# Patient Record
Sex: FEMALE | Race: WHITE | NOT HISPANIC OR LATINO | URBAN - METROPOLITAN AREA
[De-identification: names, ages, dates, MRNs, and addresses within clinical notes are randomized per-mention and may not be internally consistent; named-entity substitution may affect disease eponyms.]

---

## 2019-08-15 ENCOUNTER — OFFICE VISIT (OUTPATIENT)
Dept: FAMILY MEDICINE | Facility: CLINIC | Age: 64
End: 2019-08-15
Payer: COMMERCIAL

## 2019-08-15 ENCOUNTER — HOSPITAL ENCOUNTER (OUTPATIENT)
Dept: RADIOLOGY | Age: 64
Discharge: HOME | End: 2019-08-15
Attending: INTERNAL MEDICINE
Payer: COMMERCIAL

## 2019-08-15 VITALS
OXYGEN SATURATION: 98 % | HEIGHT: 64 IN | BODY MASS INDEX: 21.24 KG/M2 | DIASTOLIC BLOOD PRESSURE: 70 MMHG | RESPIRATION RATE: 16 BRPM | WEIGHT: 124.4 LBS | TEMPERATURE: 98.3 F | SYSTOLIC BLOOD PRESSURE: 110 MMHG | HEART RATE: 92 BPM

## 2019-08-15 DIAGNOSIS — R10.84 GENERALIZED ABDOMINAL PAIN: ICD-10-CM

## 2019-08-15 DIAGNOSIS — R11.0 NAUSEA: ICD-10-CM

## 2019-08-15 DIAGNOSIS — K59.00 CONSTIPATION, UNSPECIFIED CONSTIPATION TYPE: ICD-10-CM

## 2019-08-15 DIAGNOSIS — K59.00 CONSTIPATION, UNSPECIFIED CONSTIPATION TYPE: Primary | ICD-10-CM

## 2019-08-15 PROCEDURE — 36415 COLL VENOUS BLD VENIPUNCTURE: CPT | Performed by: INTERNAL MEDICINE

## 2019-08-15 PROCEDURE — 99203 OFFICE O/P NEW LOW 30 MIN: CPT | Mod: 25 | Performed by: INTERNAL MEDICINE

## 2019-08-15 PROCEDURE — 74022 RADEX COMPL AQT ABD SERIES: CPT

## 2019-08-15 RX ORDER — ACETAMINOPHEN 500 MG
2000 TABLET ORAL DAILY
COMMUNITY

## 2019-08-15 RX ORDER — MULTIVITAMIN WITH IRON
2 TABLET ORAL DAILY
COMMUNITY

## 2019-08-15 RX ORDER — FLUOXETINE HYDROCHLORIDE 20 MG/1
20 CAPSULE ORAL DAILY
COMMUNITY

## 2019-08-15 RX ORDER — BUPROPION HYDROCHLORIDE 150 MG/1
150 TABLET ORAL DAILY
COMMUNITY

## 2019-08-15 RX ORDER — VALACYCLOVIR HYDROCHLORIDE 500 MG/1
500 TABLET, FILM COATED ORAL DAILY
COMMUNITY

## 2019-08-15 RX ORDER — FLAXSEED OIL 1000 MG
CAPSULE ORAL
COMMUNITY

## 2019-08-15 ASSESSMENT — ENCOUNTER SYMPTOMS
PALPITATIONS: 0
UNEXPECTED WEIGHT CHANGE: 0
WHEEZING: 0
APPETITE CHANGE: 0
HEMATURIA: 0
SHORTNESS OF BREATH: 0
STRIDOR: 0
APNEA: 0
CHILLS: 0
CONSTIPATION: 1
POLYPHAGIA: 0
DIFFICULTY URINATING: 0
ANAL BLEEDING: 0
SINUS PRESSURE: 0
CHOKING: 0
VOMITING: 0
CHEST TIGHTNESS: 0
COUGH: 0
FLANK PAIN: 0
FATIGUE: 0
PHOTOPHOBIA: 0
BLOOD IN STOOL: 0
FREQUENCY: 0
EYE PAIN: 0
VOICE CHANGE: 0
ACTIVITY CHANGE: 0
EYE DISCHARGE: 0
FEVER: 0
SINUS PAIN: 0
DIARRHEA: 0
EYE REDNESS: 0
NAUSEA: 1
POLYDIPSIA: 0
RECTAL PAIN: 0
DIAPHORESIS: 0
EYE ITCHING: 0
DYSURIA: 0
TROUBLE SWALLOWING: 0

## 2019-08-15 NOTE — PROGRESS NOTES
Subjective      Patient ID: Lula León is a 64 y.o. female.  1955      HPI     64-year-old female presents for sick visit.  New patient to office.  Reports history of interstitial lung disease, hypersensitivity pneumonitis.  Previously followed by pulmonary.  States that this is not an active issue currently.  Reports history of overeating disorder, on Prozac and Wellbutrin.  Reports that her physical from her former PCP a few months ago.  Has an issue with chronic constipation for which she takes daily senna and Colace as well as a fiber supplement.  She states she had a screening colonoscopy done in the spring 2019 and told that this was normal to follow-up in 5 years was recommended.  She reports recently developing some upper respiratory symptoms.  Notes sick contacts with similar symptoms recently.  Had a few days of cough, nasal congestion, sore throat.  States that the symptoms are much improved currently.  Denies any chest pain or shortness of breath.  Had some chills initially but this has resolved.  No fevers.  No wheezing.  No diarrhea.  Notes that over the past few days she has noticed worsening of her chronic constipation.  No rectal bleeding or melena.  She is passing gas.  She has nausea and periumbilical abdominal pain.  No vomiting.  Pain is worse after meals.  She has a fair appetite and states her p.o. intake has been normal.  No urinary concerns.  No vaginal bleeding.    The following have been reviewed and updated as appropriate in this visit:  Tobacco  Allergies  Meds  Med Hx  Surg Hx  Fam Hx  Soc Hx      Review of Systems   Constitutional: Negative for activity change, appetite change, chills, diaphoresis, fatigue, fever and unexpected weight change.   HENT: Negative for drooling, ear discharge, ear pain, hearing loss, mouth sores, sinus pain, sinus pressure, sneezing, tinnitus, trouble swallowing and voice change.    Eyes: Negative for photophobia, pain, discharge, redness,  "itching and visual disturbance.   Respiratory: Negative for apnea, cough, choking, chest tightness, shortness of breath, wheezing and stridor.    Cardiovascular: Negative for chest pain, palpitations and leg swelling.   Gastrointestinal: Positive for constipation and nausea. Negative for anal bleeding, blood in stool, diarrhea, rectal pain and vomiting.   Endocrine: Negative for cold intolerance, heat intolerance, polydipsia, polyphagia and polyuria.   Genitourinary: Negative for decreased urine volume, difficulty urinating, dysuria, enuresis, flank pain, frequency, genital sores, hematuria and urgency.       Objective     Vitals:    08/15/19 0845   BP: 110/70   BP Location: Left upper arm   Patient Position: Sitting   Pulse: 92   Resp: 16   Temp: 36.8 °C (98.3 °F)   TempSrc: Oral   SpO2: 98%   Weight: 56.4 kg (124 lb 6.4 oz)   Height: 1.626 m (5' 4\")     Body mass index is 21.35 kg/m².    Physical Exam   Constitutional: She is oriented to person, place, and time. She appears well-developed and well-nourished. No distress.   Cardiovascular: Normal rate, regular rhythm and normal heart sounds.  Exam reveals no gallop and no friction rub.    No murmur heard.  Pulmonary/Chest: Effort normal and breath sounds normal. No respiratory distress. She has no wheezes. She has no rales.   Abdominal: Soft. Bowel sounds are normal. She exhibits no distension.   Mild epigastric and periumbilical tenderness to palpation.  No rebound or guarding.  No CVA tenderness.   Musculoskeletal: She exhibits no edema.   Neurological: She is alert and oriented to person, place, and time.   Skin: She is not diaphoretic.   Psychiatric: She has a normal mood and affect. Her behavior is normal.   Vitals reviewed.      Assessment/Plan   Diagnoses and all orders for this visit:    Constipation, unspecified constipation type (Primary)  -     TSH w reflex FT4    Generalized abdominal pain  -     CBC and differential  -     Basic metabolic panel  -     " Hepatic function panel  -     Urinalysis with Reflex Culture  -     Amylase  -     Lipase  -     TSH w reflex FT4  -     CBC  -     Diff Count  -     UA Reflex to Culture (Macroscopic)    Nausea  -     CBC and differential  -     Basic metabolic panel  -     Hepatic function panel  -     Urinalysis with Reflex Culture  -     Amylase  -     Lipase  -     TSH w reflex FT4  -     CBC  -     Diff Count  -     UA Reflex to Culture (Macroscopic)  -Recommend check check labs as above, check urinalysis.  Check abdominal x-ray.  Recommend continue with bowel regimen, increase hydration, recommend MiraLAX daily.  We will follow-up with patient after above has returned.  Any worsening or new concerns she is to let me know.  ER precautions discussed with patient.  Obtain recent colonoscopy from spring 2019-normal per patient.    abd XR noted-fecal retention. No obstruction. Discussed with pt. rec she try the miralax and plan as above. F/u on labs. Notify with worsening, new sx.     Labs-no sig findings. MCV high, will try to add b12, folate if possible. Spoke with pt. She is feeling improved. Constipation responded to miralax. She will keep me updated.

## 2019-08-16 ENCOUNTER — TELEPHONE (OUTPATIENT)
Dept: FAMILY MEDICINE | Facility: CLINIC | Age: 64
End: 2019-08-16

## 2019-08-16 LAB
ALBUMIN SERPL-MCNC: 4.5 G/DL (ref 3.6–4.8)
ALP SERPL-CCNC: 50 IU/L (ref 39–117)
ALT SERPL-CCNC: 15 IU/L (ref 0–32)
AMYLASE SERPL-CCNC: 93 U/L (ref 31–124)
APPEARANCE UR: CLEAR
AST SERPL-CCNC: 14 IU/L (ref 0–40)
BACTERIA #/AREA URNS HPF: NORMAL /[HPF]
BASOPHILS # BLD AUTO: 0 X10E3/UL (ref 0–0.2)
BASOPHILS NFR BLD AUTO: 1 %
BILIRUB DIRECT SERPL-MCNC: 0.09 MG/DL (ref 0–0.4)
BILIRUB SERPL-MCNC: 0.3 MG/DL (ref 0–1.2)
BILIRUB UR QL STRIP: NEGATIVE
BUN SERPL-MCNC: 13 MG/DL (ref 8–27)
BUN/CREAT SERPL: 20 (ref 12–28)
CALCIUM SERPL-MCNC: 9.1 MG/DL (ref 8.7–10.3)
CHLORIDE SERPL-SCNC: 97 MMOL/L (ref 96–106)
CO2 SERPL-SCNC: 21 MMOL/L (ref 20–29)
COLOR UR: YELLOW
CREAT SERPL-MCNC: 0.65 MG/DL (ref 0.57–1)
EOSINOPHIL # BLD AUTO: 0.1 X10E3/UL (ref 0–0.4)
EOSINOPHIL NFR BLD AUTO: 1 %
EPI CELLS #/AREA URNS HPF: NORMAL /HPF
ERYTHROCYTE [DISTWIDTH] IN BLOOD BY AUTOMATED COUNT: 14.3 % (ref 12.3–15.4)
GLUCOSE SERPL-MCNC: 95 MG/DL (ref 65–99)
GLUCOSE UR QL: NEGATIVE
HCT VFR BLD AUTO: 41.5 % (ref 34–46.6)
HGB BLD-MCNC: 14.4 G/DL (ref 11.1–15.9)
HGB UR QL STRIP: NEGATIVE
IMM GRANULOCYTES # BLD AUTO: 0 X10E3/UL (ref 0–0.1)
IMM GRANULOCYTES NFR BLD AUTO: 0 %
KETONES UR QL STRIP: NEGATIVE
LAB CORP EGFR IF AFRICN AM: 109 ML/MIN/1.73
LAB CORP EGFR IF NONAFRICN AM: 94 ML/MIN/1.73
LAB CORP URINALYSIS REFLEX: (no result)
LEUKOCYTE ESTERASE UR QL STRIP: NEGATIVE
LIPASE SERPL-CCNC: 36 U/L (ref 14–72)
LYMPHOCYTES # BLD AUTO: 1.1 X10E3/UL (ref 0.7–3.1)
LYMPHOCYTES NFR BLD AUTO: 30 %
MCH RBC QN AUTO: 34 PG (ref 26.6–33)
MCHC RBC AUTO-ENTMCNC: 34.7 G/DL (ref 31.5–35.7)
MCV RBC AUTO: 98 FL (ref 79–97)
MICRO URNS: (no result)
MICRO URNS: (no result)
MONOCYTES # BLD AUTO: 0.4 X10E3/UL (ref 0.1–0.9)
MONOCYTES NFR BLD AUTO: 10 %
MUCOUS THREADS URNS QL MICRO: PRESENT
NEUTROPHILS # BLD AUTO: 2.1 X10E3/UL (ref 1.4–7)
NEUTROPHILS NFR BLD AUTO: 58 %
NITRITE UR QL STRIP: NEGATIVE
PH UR STRIP: 7 [PH] (ref 5–7.5)
PLATELET # BLD AUTO: 195 X10E3/UL (ref 150–450)
POTASSIUM SERPL-SCNC: 4.7 MMOL/L (ref 3.5–5.2)
PROT SERPL-MCNC: 6.5 G/DL (ref 6–8.5)
PROT UR QL STRIP: NEGATIVE
RBC # BLD AUTO: 4.23 X10E6/UL (ref 3.77–5.28)
RBC #/AREA URNS HPF: NORMAL /HPF
SODIUM SERPL-SCNC: 135 MMOL/L (ref 134–144)
SP GR UR: 1.01 (ref 1–1.03)
T4 FREE SERPL-MCNC: 1.26 NG/DL (ref 0.82–1.77)
TSH SERPL DL<=0.005 MIU/L-ACNC: 1.31 UIU/ML (ref 0.45–4.5)
UROBILINOGEN UR STRIP-MCNC: 0.2 MG/DL (ref 0.2–1)
WBC # BLD AUTO: 3.6 X10E3/UL (ref 3.4–10.8)
WBC #/AREA URNS HPF: NORMAL /HPF

## 2019-08-20 LAB — SPECIMEN STATUS: NORMAL

## 2019-08-21 LAB
FOLATE SERPL-MCNC: >20 NG/ML
SPECIMEN STATUS: NORMAL
VIT B12 SERPL-MCNC: 814 PG/ML (ref 232–1245)

## 2021-04-13 DIAGNOSIS — Z23 ENCOUNTER FOR IMMUNIZATION: ICD-10-CM
